# Patient Record
Sex: MALE | Race: BLACK OR AFRICAN AMERICAN
[De-identification: names, ages, dates, MRNs, and addresses within clinical notes are randomized per-mention and may not be internally consistent; named-entity substitution may affect disease eponyms.]

---

## 2017-09-04 ENCOUNTER — HOSPITAL ENCOUNTER (EMERGENCY)
Dept: HOSPITAL 61 - ER | Age: 52
Discharge: HOME | End: 2017-09-04
Payer: SELF-PAY

## 2017-09-04 VITALS — DIASTOLIC BLOOD PRESSURE: 112 MMHG | SYSTOLIC BLOOD PRESSURE: 186 MMHG

## 2017-09-04 VITALS — BODY MASS INDEX: 34.06 KG/M2 | HEIGHT: 67 IN | WEIGHT: 217 LBS

## 2017-09-04 DIAGNOSIS — R42: Primary | ICD-10-CM

## 2017-09-04 DIAGNOSIS — M54.2: ICD-10-CM

## 2017-09-04 DIAGNOSIS — R06.02: ICD-10-CM

## 2017-09-04 DIAGNOSIS — I10: ICD-10-CM

## 2017-09-04 DIAGNOSIS — R20.0: ICD-10-CM

## 2017-09-04 LAB
ANION GAP SERPL CALC-SCNC: 7 MMOL/L (ref 6–14)
BASOPHILS # BLD AUTO: 0.1 X10^3/UL (ref 0–0.2)
BASOPHILS NFR BLD: 1 % (ref 0–3)
BUN SERPL-MCNC: 15 MG/DL (ref 8–26)
CALCIUM SERPL-MCNC: 9.1 MG/DL (ref 8.5–10.1)
CHLORIDE SERPL-SCNC: 103 MMOL/L (ref 98–107)
CO2 SERPL-SCNC: 31 MMOL/L (ref 21–32)
CREAT SERPL-MCNC: 1 MG/DL (ref 0.7–1.3)
EOSINOPHIL NFR BLD: 4 % (ref 0–3)
ERYTHROCYTE [DISTWIDTH] IN BLOOD BY AUTOMATED COUNT: 15.7 % (ref 11.5–14.5)
GFR SERPLBLD BASED ON 1.73 SQ M-ARVRAT: 95.7 ML/MIN
GLUCOSE SERPL-MCNC: 103 MG/DL (ref 70–99)
HCT VFR BLD CALC: 40.8 % (ref 39–53)
HGB BLD-MCNC: 13.5 G/DL (ref 13–17.5)
LYMPHOCYTES # BLD: 1.6 X10^3/UL (ref 1–4.8)
LYMPHOCYTES NFR BLD AUTO: 31 % (ref 24–48)
MCH RBC QN AUTO: 28 PG (ref 25–35)
MCHC RBC AUTO-ENTMCNC: 33 G/DL (ref 31–37)
MCV RBC AUTO: 83 FL (ref 79–100)
MONOCYTES NFR BLD: 9 % (ref 0–9)
NEUTROPHILS NFR BLD AUTO: 54 % (ref 31–73)
PLATELET # BLD AUTO: 334 X10^3/UL (ref 140–400)
POTASSIUM SERPL-SCNC: 4 MMOL/L (ref 3.5–5.1)
RBC # BLD AUTO: 4.93 X10^6/UL (ref 4.3–5.7)
SODIUM SERPL-SCNC: 141 MMOL/L (ref 136–145)
WBC # BLD AUTO: 5.1 X10^3/UL (ref 4–11)

## 2017-09-04 PROCEDURE — 84484 ASSAY OF TROPONIN QUANT: CPT

## 2017-09-04 PROCEDURE — 71020: CPT

## 2017-09-04 PROCEDURE — 93005 ELECTROCARDIOGRAM TRACING: CPT

## 2017-09-04 PROCEDURE — 80048 BASIC METABOLIC PNL TOTAL CA: CPT

## 2017-09-04 PROCEDURE — 99285 EMERGENCY DEPT VISIT HI MDM: CPT

## 2017-09-04 PROCEDURE — 85025 COMPLETE CBC W/AUTO DIFF WBC: CPT

## 2017-09-04 PROCEDURE — 82962 GLUCOSE BLOOD TEST: CPT

## 2017-09-04 PROCEDURE — 96374 THER/PROPH/DIAG INJ IV PUSH: CPT

## 2017-09-04 PROCEDURE — 36415 COLL VENOUS BLD VENIPUNCTURE: CPT

## 2017-09-04 NOTE — RAD
Examination: 2 views of the chest



History: History of dizziness, shortness of breath



Comparison: None available



Findings:



The cardiomediastinal silhouette grossly appears unremarkable. Mild prominent

appearing perihilar bronchovascular markings likely prominent pulmonary

arterial branches. Minimal right lung base airspace opacity likely atelectasis

or infiltrate.



Impression:



Minimal right lung base airspace opacity likely atelectasis or infiltrate.

## 2017-09-04 NOTE — EKG
[facility]

              [address] [place] [address]

Test Date:    2017               Test Time:    06:24:37

Pat Name:     ONEYDA SWANSON         Department:   

Patient ID:   [id number]           Room:          

Gender:       M                        Technician:   

:          1965               Requested By: MIKE WHITE

Order Number: [id number]            Reading MD:     

                                 Measurements

Intervals                              Axis          

Rate:         76                       P:            56

WY:           174                      QRS:          32

QRSD:         96                       T:            -151

QT:           402                                    

QTc:          457                                    

                           Interpretive Statements

SINUS RHYTHM

LEFT ATRIAL ABNORMALITY

LVH WITH REPOLARIZATION ABNORMALITY

ABNORMAL ECG

RI6.01

No previous ECG available for comparison

## 2017-09-04 NOTE — PHYS DOC
Past Medical History


Past Medical History:  Hypertension


Alcohol Use:  Occasionally


Additional Information:  


"on my days off"; couple times a week


Drug Use:  None





Adult General


Chief Complaint


Chief Complaint:  DIZZY/LIGHT HEADED





HPI


HPI


52-year-old male presenting today to the emergency department with intermittent 

dizziness and lightheadedness when he stands up. He reports shortness of breath 

yesterday. His dizziness is been going on for the past 2 days. Currently he is 

not dizzy. Currently he is entirely asymptomatic. He has a history of high 

blood pressure however does not take his blood pressure medications. He denies 

vision changes, unilateral weakness, slurred speech, or facial droop. He denies 

chest pain abdominal pain nausea vomiting. He denies blood in his stools. He 

also complains of pain in the neck that is nearly absent currently but comes 

and goes over the past 7-10 days. With his pain he has associated intermittent 

numbness in his right hand.





Review of systems is negative for fevers chills cough headache. All other 

review of systems is negative unless otherwise noted in history of present 

illness.





ED course: 52-year-old male presenting to the emergency department today with 

generalized dizziness for the past 2 days. Currently he is completely 

asymptomatic. Of note his triage blood pressure was very elevated. Chest x-ray 

and blood work obtained. IV labetalol given for his blood pressure. EKG 

reviewed by myself shows sinus rhythm with a regular rate. Axis is leftward. ST 

segments show repolarization abnormality likely secondary to left ventricular 

hypertrophy. T-wave inversions in the lateral leads nonspecific repolarization. 

Labs unremarkable. Patient's blood pressure came down to about 180 systolic. On 

reexamination he remains asymptomatic. I prescribed the patient 

hydrochlorothiazide to initiate treatment for hypertension follow-up with his 

primary care physician. The patient was then discharged home in stable 

condition to follow up with their primary care physician over the next 2-3 

days. They were to return if their symptoms worsened or if they were concerned 

for any reason. Face-to-face discharge instructions and return precautions were 

given. Patient's questions were answered to their satisfaction. Patient is 

comfortable plan.





Review of Systems


Review of Systems


SEE ABOVE.





Current Medications


Current Medications





Current Medications








 Medications


  (Trade)  Dose


 Ordered  Sig/Adam  Start Time


 Stop Time Status Last Admin


Dose Admin


 


 Hydrochlorothiazide


  (Hydrodiuril)  25 mg  1X  ONCE  9/4/17 07:15


 9/4/17 07:16 DC 9/4/17 07:15


25 MG


 


 Hydrochlorothiazide


  (Microzide)  25 mg  1X  ONCE  9/4/17 07:15


 9/4/17 07:16 Cancel  


 


 


 Labetalol HCl


  (Normodyne)  20 mg  1X  ONCE  9/4/17 07:00


 9/4/17 07:01 DC 9/4/17 06:59


20 MG











Allergies


Allergies





Allergies








Coded Allergies Type Severity Reaction Last Updated Verified


 


  No Known Drug Allergies    9/4/17 No











Physical Exam


Physical Exam


SEE ABOVE





Constitutional: Well developed, well nourished, no acute distress, non-toxic 

appearance. 


HENT: Normocephalic, atraumatic, bilateral external ears normal, oropharynx 

moist, no oral exudates, nose normal. []


Eyes: PERRLA, EOMI, conjunctiva normal, no discharge.  


Neck: Normal range of motion, no tenderness, supple, no stridor. [] 


Cardiovascular:Heart rate regular rhythm, no murmur 


Lungs & Thorax:  Bilateral breath sounds clear to auscultation. no crackles on 

examination.


Abdomen: Bowel sounds normal, soft, no tenderness, no masses, no pulsatile 

masses.  


Skin: Warm, dry, no erythema, no rash. [] 


Back: No tenderness, no CVA tenderness.  


Extremities: No tenderness, no cyanosis, no clubbing, ROM intact, no edema. [] 


Neurologic: Alert and oriented X 3, normal motor function, normal sensory 

function, no focal deficits noted. 


Psychologic: Affect normal, judgement normal, mood normal. []





Current Patient Data


Vital Signs





 Vital Signs








  Date Time  Temp Pulse Resp B/P (MAP) Pulse Ox O2 Delivery O2 Flow Rate FiO2


 


9/4/17 07:15  65 20 184/112 (136) 98 Room Air  


 


9/4/17 06:21 98.5       





 98.5       








Lab Values





 Laboratory Tests








Test


  9/4/17


06:30 9/4/17


06:32


 


White Blood Count


  5.1 x10^3/uL


(4.0-11.0) 


 


 


Red Blood Count


  4.93 x10^6/uL


(4.30-5.70) 


 


 


Hemoglobin


  13.5 g/dL


(13.0-17.5) 


 


 


Hematocrit


  40.8 %


(39.0-53.0) 


 


 


Mean Corpuscular Volume


  83 fL ()


  


 


 


Mean Corpuscular Hemoglobin 28 pg (25-35)   


 


Mean Corpuscular Hemoglobin


Concent 33 g/dL


(31-37) 


 


 


Red Cell Distribution Width


  15.7 %


(11.5-14.5)  H 


 


 


Platelet Count


  334 x10^3/uL


(140-400) 


 


 


Neutrophils (%) (Auto) 54 % (31-73)   


 


Lymphocytes (%) (Auto) 31 % (24-48)   


 


Monocytes (%) (Auto) 9 % (0-9)   


 


Eosinophils (%) (Auto) 4 % (0-3)  H 


 


Basophils (%) (Auto) 1 % (0-3)   


 


Neutrophils # (Auto)


  2.7 x10^3uL


(1.8-7.7) 


 


 


Lymphocytes # (Auto)


  1.6 x10^3/uL


(1.0-4.8) 


 


 


Monocytes # (Auto)


  0.5 x10^3/uL


(0.0-1.1) 


 


 


Eosinophils # (Auto)


  0.2 x10^3/uL


(0.0-0.7) 


 


 


Basophils # (Auto)


  0.1 x10^3/uL


(0.0-0.2) 


 


 


Sodium Level


  141 mmol/L


(136-145) 


 


 


Potassium Level


  4.0 mmol/L


(3.5-5.1) 


 


 


Chloride Level


  103 mmol/L


() 


 


 


Carbon Dioxide Level


  31 mmol/L


(21-32) 


 


 


Anion Gap 7 (6-14)   


 


Blood Urea Nitrogen


  15 mg/dL


(8-26) 


 


 


Creatinine


  1.0 mg/dL


(0.7-1.3) 


 


 


Estimated GFR


(Cockcroft-Gault) 95.7  


  


 


 


Glucose Level


  103 mg/dL


(70-99)  H 


 


 


Calcium Level


  9.1 mg/dL


(8.5-10.1) 


 


 


Troponin I Quantitative


  0.030 ng/mL


(0.000-0.055) 


 


 


Glucose (Fingerstick)


  


  95 mg/dL


(70-99)





 Laboratory Tests


9/4/17 06:30








 Laboratory Tests


9/4/17 06:30














EKG


EKG


[]





Radiology/Procedures


Radiology/Procedures


[]





Course & Med Decision Making


Course & Med Decision Making


Pertinent Labs and Imaging studies reviewed. (See chart for details)





[]





Dragon Disclaimer


Dragon Disclaimer


This electronic medical record was generated, in whole or in part, using a 

voice recognition dictation system.





Departure


Departure


Impression:  


 Primary Impression:  


 Dizziness


 Additional Impressions:  


 Shortness of breath


 Hypertension


Disposition:  01 HOME, SELF-CARE


Condition:  STABLE


Referrals:  


NO PCP (PCP)








MELI TEJEDA MD


Patient Instructions:  Dizziness





Additional Instructions:  


Thank you for allowing us to participate in your care today.





Followup with your primary care physician in 3 days if your symptoms do not 

improve.  Call your Primary Doctor tomorrow and inform them of your visit 

today.  If you do not have a primary care provider you can ask for a list of 

our primary care providers. Return to the emergency department you have any new 

or concerning findings.





This should be evaluated by the primary care physician and any necessary 

consulting services for continued management within a few days after discharge. 

Return to emergency room if you have any  new or concerning symptoms including 

but not limited to fever, chills, nausea, vomiting, intractable pain, any new 

rashes, chest pain, shortness of air, uncontrolled bleeding, difficulty 

breathing, and/or vision loss.


Scripts


Hydrochlorothiazide (HYDROCHLOROTHIAZIDE TABLET  **) 25 Mg Tablet


1 TAB PO DAILY, #10 TAB 0 Refills


   Prov: MIKE WHITE MD         9/4/17





Problem Qualifiers











MIKE WHITE MD Sep 4, 2017 07:10

## 2019-10-25 ENCOUNTER — HOSPITAL ENCOUNTER (EMERGENCY)
Dept: HOSPITAL 61 - ER | Age: 54
Discharge: HOME | End: 2019-10-25
Payer: COMMERCIAL

## 2019-10-25 VITALS — BODY MASS INDEX: 34.06 KG/M2 | WEIGHT: 217 LBS | HEIGHT: 67 IN

## 2019-10-25 VITALS — SYSTOLIC BLOOD PRESSURE: 192 MMHG | DIASTOLIC BLOOD PRESSURE: 103 MMHG

## 2019-10-25 DIAGNOSIS — J20.9: Primary | ICD-10-CM

## 2019-10-25 DIAGNOSIS — I16.0: ICD-10-CM

## 2019-10-25 PROCEDURE — 94640 AIRWAY INHALATION TREATMENT: CPT

## 2019-10-25 PROCEDURE — 99284 EMERGENCY DEPT VISIT MOD MDM: CPT

## 2019-10-25 PROCEDURE — 71046 X-RAY EXAM CHEST 2 VIEWS: CPT

## 2019-10-25 NOTE — PHYS DOC
Past Medical History


Past Medical History:  Hypertension


Alcohol Use:  Occasionally


Drug Use:  None





Adult General


Chief Complaint


Chief Complaint:  COUGH





HPI


HPI





Patient is a 54  year old male who presents with complaining of cough. Patient 

complaining of cough with shortness of breath during episodes of cough for the 

last 5 days that getting gradually getting worse. Patient states he taking 

Mucinex and sometimes has brownish. Patient complaining of back and bilateral 

chest wall pain during episodes of cough without fever and chills, nausea, 

vomiting, diarrhea or constipation, urinary symptom. Patient had sick contacts 

at home. Patient states he stopped taking his blood pressure medication for the 

last 5 days because of taking Mucinex.





Review of Systems


Review of Systems





Constitutional: Denies fever or chills []


Eyes: Denies change in visual acuity, redness, or eye pain []


HENT: Denies nasal congestion or sore throat []


Respiratory: Reports cough and shortness of breath


Cardiovascular: No additional information not addressed in HPI []


GI: Denies abdominal pain, nausea, vomiting, bloody stools or diarrhea []


: Denies dysuria or hematuria []


Musculoskeletal: Denies back pain or joint pain []


Integument: Denies rash or skin lesions []


Neurologic: Denies headache, focal weakness or sensory changes []


Endocrine: Denies polyuria or polydipsia []





All other systems were reviewed and found to be within normal limits, except as 

documented in this note.





Current Medications


Current Medications





Current Medications








 Medications


  (Trade)  Dose


 Ordered  Sig/Adam  Start Time


 Stop Time Status Last Admin


Dose Admin


 


 Albuterol/


 Ipratropium


  (Duoneb)  3 ml  STK-MED ONCE  10/25/19 01:48


 10/25/19 01:48 DC  





 


 Clonidine HCl


  (Catapres)  0.2 mg  1X  ONCE  10/25/19 04:00


 10/25/19 04:01  10/25/19 03:20


0.2 MG


 


 Midazolam HCl  100 ml @ 5


 mls/hr  CONT  PRN  10/25/19 01:30


   Cancel  





 


 Norepinephrine


 Bitartrate  250 ml @ 


 18.375 mls/


 hr  CONT  PRN  10/25/19 02:00


   Cancel  





 


 Prednisone


  (Prednisone)  50 mg  1X  ONCE  10/25/19 02:00


 10/25/19 02:01 DC 10/25/19 02:30


50 MG











Allergies


Allergies





Allergies








Coded Allergies Type Severity Reaction Last Updated Verified


 


  No Known Drug Allergies    17 No











Physical Exam


Physical Exam





Constitutional: Well developed, well nourished, mild distress, non-toxic 

appearance. []


HENT: Normocephalic, atraumatic, bilateral external ears normal, oropharynx 

moist, no oral exudates, nose normal. []


Eyes: PERRLA, EOMI, conjunctiva normal, no discharge. [] 


Neck: Normal range of motion, no tenderness, supple, no stridor. [] 


Cardiovascular:Heart rate regular rhythm, no murmur []


Lungs & Thorax: Wheezing without respiratory distress.


Abdomen: Bowel sounds normal, soft, no tenderness, no masses, no pulsatile 

masses. [] 


Skin: Warm, dry, no erythema, no rash. [] 


Back: No tenderness, no CVA tenderness. [] 


Extremities: No tenderness, no cyanosis, no clubbing, ROM intact, no edema. [] 


Neurologic: Alert and oriented X 3, normal motor function, normal sensory 

function, no focal deficits noted. []


Psychologic: Affect normal, judgement normal, mood normal. []





Current Patient Data


Vital Signs





                                   Vital Signs








  Date Time  Temp Pulse Resp B/P (MAP) Pulse Ox O2 Delivery O2 Flow Rate FiO2


 


10/25/19 03:20  76  208/103    


 


10/25/19 02:45     97 Room Air  


 


10/25/19 01:06 98.1  16     





 98.1       











EKG


EKG


[]





Radiology/Procedures


Radiology/Procedures


[][facility]


                    [address] [place] [address]


                                 [phone]


                                        


                                 IMAGING REPORT





                                     Signed





PATIENT: ONEYDA SWANSON LACCOUNT: [id number]     MRN#: [id number]


: 1965           LOCATION: ER              AGE: 54


SEX: M                    EXAM DT: 10/25/19         ACCESSION#: [id number]


STATUS: REG ER            ORD. PHYSICIAN: IGLESIA BOBBY MD


REASON: cough and shortness of breath


PROCEDURE: CHEST PA & LATERAL





CHEST PA   LATERAL


 


CLINICAL INDICATION: Cough and shortness of breath


 


COMPARISON: None


 


FINDINGS:  


 


Heart is normal in size. Mild interstitial opacities without focal 


consolidation. No pneumothorax or pleural effusion. Visualized bony thorax


within normal limits.


 


IMPRESSION: 


 


Findings of mild atypical/viral infection.


 


Electronically signed by: Thierno Alicea DO (10/25/2019 2:30 AM) [facility]














DICTATED and SIGNED BY:     THIERNO ALICEA DO


DATE:     10/25/19 0230





Course & Med Decision Making


Course & Med Decision Making


Pertinent  Imaging studies reviewed. (See chart for details)





Evaluation of patient in ER showed 54-year-old male patient presented to ER with

 complaining of cough for 5 days. Patient had blood pressure of 230/123 at 

arrival to ER because of not taking his blood pressure medication for 5 days due

 to taking Mucinex. Patient treated with DuoNeb and prednisone incarcerated and 

felt better. X-ray did not show infiltration. Patient supposed to take 6 

different blood pressure medications. Patient treated with clonidine and blood 

pressure gradually decreased to 182/102. Patient was advised to take his blood 

pressure medication as directed. Plan discharge patient home with diagnosis of 

acute bronchitis and hypertensive urgency.





Dragon Disclaimer


Dragon Disclaimer


This electronic medical record was generated, in whole or in part, using a voice

 recognition dictation system.





Departure


Departure


Impression:  


   Primary Impression:  


   Acute bronchitis


   Additional Impression:  


   Hypertensive urgency


Disposition:   HOME, SELF-CARE (at 0310)


Condition:  IMPROVED


Referrals:  


NO PCP (PCP)


Patient Instructions:  Acute Bronchitis, Managing Your High Blood Pressure





Additional Instructions:  


Drink plenty of liquids


Follow-up with your primary care physician in 3-5 days


Return to ER if not getting better


Continue your home blood pressure medication


Scripts


Benzonatate (TESSALON PERLE) 100 Mg Capsule


1 CAP PO TID for cough, #21 CAP


   Prov: IGLESIA BOBBY MD         10/25/19 


Albuterol Sulfate (PROAIR HFA INHALER) 8.5 Gm Hfa.aer.ad


2 PUFF IH PRN Q4-6HRS PRN for wheezing for 21 Days, #1 INHALER 0 Refills


   Prov: IGLESIA BOBBY MD         10/25/19 


Methylprednisolone (MEDROL) 4 Mg Tab.ds.pk


1 PKG PO UD for inflammation, #1 PKG


   Prov: IGLESIA BOBBY MD         10/25/19 


Amoxicillin/Potassium Clav (AUGMENTIN 875-125 TABLET) 1 Each Tablet


1 TAB PO Q12HR, #20 TAB


   Prov: IGLESIA BOBBY MD         10/25/19





Problem Qualifiers








   Primary Impression:  


   Acute bronchitis


   Bronchitis organism:  unspecified organism  Qualified Codes:  J20.9 - Acute 

   bronchitis, unspecified








IGLESIA BOBBY MD             Oct 25, 2019 03:09

## 2019-10-25 NOTE — RAD
CHEST PA   LATERAL

 

CLINICAL INDICATION: Cough and shortness of breath

 

COMPARISON: None

 

FINDINGS:  

 

Heart is normal in size. Mild interstitial opacities without focal 

consolidation. No pneumothorax or pleural effusion. Visualized bony thorax

within normal limits.

 

IMPRESSION: 

 

Findings of mild atypical/viral infection.

 

Electronically signed by: Thierno Panda DO (10/25/2019 2:30 AM) [facility]